# Patient Record
Sex: MALE | Race: WHITE | NOT HISPANIC OR LATINO | Employment: OTHER | URBAN - METROPOLITAN AREA
[De-identification: names, ages, dates, MRNs, and addresses within clinical notes are randomized per-mention and may not be internally consistent; named-entity substitution may affect disease eponyms.]

---

## 2018-07-16 ENCOUNTER — HOSPITAL ENCOUNTER (OUTPATIENT)
Facility: HOSPITAL | Age: 78
Setting detail: OUTPATIENT SURGERY
Discharge: HOME | End: 2018-07-16
Attending: COLON & RECTAL SURGERY | Admitting: COLON & RECTAL SURGERY
Payer: MEDICARE

## 2018-07-16 ENCOUNTER — ANESTHESIA (OUTPATIENT)
Dept: GASTROENTEROLOGY | Facility: HOSPITAL | Age: 78
End: 2018-07-16
Payer: MEDICARE

## 2018-07-16 ENCOUNTER — ANESTHESIA EVENT (OUTPATIENT)
Dept: GASTROENTEROLOGY | Facility: HOSPITAL | Age: 78
End: 2018-07-16
Payer: MEDICARE

## 2018-07-16 VITALS
OXYGEN SATURATION: 97 % | BODY MASS INDEX: 24.38 KG/M2 | HEIGHT: 74 IN | DIASTOLIC BLOOD PRESSURE: 64 MMHG | RESPIRATION RATE: 20 BRPM | TEMPERATURE: 96 F | SYSTOLIC BLOOD PRESSURE: 122 MMHG | WEIGHT: 190 LBS | HEART RATE: 60 BPM

## 2018-07-16 DIAGNOSIS — K57.90 DIVERTICULOSIS OF INTESTINE WITHOUT PERFORATION OR ABSCESS WITHOUT BLEEDING: ICD-10-CM

## 2018-07-16 DIAGNOSIS — Z86.010 HISTORY OF COLONIC POLYPS: ICD-10-CM

## 2018-07-16 PROCEDURE — 88305 TISSUE EXAM BY PATHOLOGIST: CPT | Performed by: PATHOLOGY

## 2018-07-16 RX ORDER — LISINOPRIL 10 MG/1
10 TABLET ORAL
COMMUNITY
Start: 2018-04-16

## 2018-07-16 RX ORDER — TACROLIMUS 0.3 MG/G
OINTMENT TOPICAL
COMMUNITY

## 2018-07-16 RX ORDER — CLOBETASOL PROPIONATE 0.5 MG/G
OINTMENT TOPICAL
COMMUNITY
Start: 2017-09-18

## 2018-07-16 RX ORDER — ROSUVASTATIN CALCIUM 10 MG/1
10 TABLET, COATED ORAL
COMMUNITY
Start: 2018-04-30

## 2018-07-16 RX ORDER — SODIUM CHLORIDE 9 MG/ML
75 INJECTION, SOLUTION INTRAVENOUS CONTINUOUS
Status: DISCONTINUED | OUTPATIENT
Start: 2018-07-16 | End: 2018-07-16 | Stop reason: HOSPADM

## 2018-07-16 RX ORDER — PROPOFOL 10 MG/ML
INJECTION, EMULSION INTRAVENOUS AS NEEDED
Status: DISCONTINUED | OUTPATIENT
Start: 2018-07-16 | End: 2018-07-16 | Stop reason: SURG

## 2018-07-16 RX ORDER — CALCIPOTRIENE, BETAMETHASONE DIPROPIONATE 50; .643 UG/G; MG/G
OINTMENT TOPICAL
COMMUNITY

## 2018-07-16 RX ORDER — KETOCONAZOLE 20 MG/G
2 CREAM TOPICAL
COMMUNITY
Start: 2011-08-18

## 2018-07-16 RX ORDER — CARVEDILOL 12.5 MG/1
12.5 TABLET ORAL
COMMUNITY
Start: 2018-04-10

## 2018-07-16 RX ORDER — LORAZEPAM 0.5 MG/1
TABLET ORAL
COMMUNITY
Start: 2018-06-04 | End: 2020-01-08 | Stop reason: SDUPTHER

## 2018-07-16 RX ORDER — SPIRONOLACTONE 25 MG/1
25 TABLET ORAL
COMMUNITY

## 2018-07-16 RX ORDER — TAMSULOSIN HYDROCHLORIDE 0.4 MG/1
0.4 CAPSULE ORAL
COMMUNITY
Start: 2017-10-09

## 2018-07-16 RX ADMIN — PROPOFOL 30 MG: 10 INJECTION, EMULSION INTRAVENOUS at 10:26

## 2018-07-16 RX ADMIN — PROPOFOL 30 MG: 10 INJECTION, EMULSION INTRAVENOUS at 10:39

## 2018-07-16 RX ADMIN — PROPOFOL 20 MG: 10 INJECTION, EMULSION INTRAVENOUS at 10:46

## 2018-07-16 RX ADMIN — PROPOFOL 30 MG: 10 INJECTION, EMULSION INTRAVENOUS at 10:28

## 2018-07-16 RX ADMIN — SODIUM CHLORIDE: 0.9 INJECTION, SOLUTION INTRAVENOUS at 08:55

## 2018-07-16 RX ADMIN — PROPOFOL 30 MG: 10 INJECTION, EMULSION INTRAVENOUS at 10:43

## 2018-07-16 RX ADMIN — PROPOFOL 200 MG: 10 INJECTION, EMULSION INTRAVENOUS at 10:23

## 2018-07-16 RX ADMIN — PROPOFOL 30 MG: 10 INJECTION, EMULSION INTRAVENOUS at 10:34

## 2018-07-16 RX ADMIN — PROPOFOL 30 MG: 10 INJECTION, EMULSION INTRAVENOUS at 10:31

## 2018-07-16 RX ADMIN — PROPOFOL 20 MG: 10 INJECTION, EMULSION INTRAVENOUS at 10:36

## 2018-07-16 NOTE — OP NOTE
**** GI/ENDOSCOPY REPORT ****     PATIENT NAME: Branden Hall ------ VISIT ID:     INTRODUCTION: Colonoscopy - A 77 male patient presents for an outpatient   Colonoscopy at [address] [place] [address]  PREVIOUS COLONOSCOPY:     INDICATIONS: Screening for personal history of polyps  CONSENT:  The benefits, risks, and alternatives to the procedure were   discussed and informed consent was obtained from the patient  PREPARATION: EKG, pulse, pulse oximetry and blood pressure were monitored   throughout the procedure  The patient was identified by myself both   verbally and by visual inspection of ID band  ASA Classification: Class 2   - Patient has mild to moderate systemic disturbance that may or may not be   related to the disorder requiring surgery  Airway Assessment   Classification: Airway class 1 - Visualization of the soft palate, fauces,   uvula, and posterior pillars  EKG, pulse, pulse oximetry, and blood   pressure were monitored throughout the procedure  The patient was   identified by myself both verbally and by visual inspection of ID band  MEDICATIONS: Anesthesia-check records Anesthesia-check records     RECTAL EXAM: Normal sphincter tone  No external hemorrhoids  No internal   hemorrhoids  No prostatic nodules  Moderately enlarged prostate  PROCEDURE:  The endoscope was passed without difficulty through the anus   under direct visualization and advanced to the cecum, confirmed by   photographs  The quality of the preparation was  The scope was withdrawn   and the mucosa was carefully examined  The views were good  The patient's   toleration of the procedure was good  Cecal Intubation Time: 8 minutes(s)   Scope Withdrawal Time: 8 minutes(s)     FINDINGS:  A single frond-like/villous, sessile polyp, measuring between 5   and 10 mm in size, was found in the ascending colon    A single   frond-like/villous, sessile polyp, measuring between 5 and 10 mm in size,   was found in the ascending colon  The polyp was removed by hot biopsy   polypectomy and placed in jar 1  A single frond-like/villous, sessile   polyp, measuring between 5 and 10 mm in size, was found in the rectum  The polyp was removed by hot biopsy polypectomy and placed in jar 2  There   was evidence of mild diverticulosis in the ascending colon and transverse   colon  Otherwise, the colon appeared to be normal  Appendiceal opening   identified     COMPLICATIONS: There were no complications  IMPRESSIONS: Moderately enlarged prostate was noted during the rectal   exam  A single frond-like/villous, sessile polyp found in the ascending   colon; removed by hot biopsy polypectomy  a single frond-like/villous,   sessile polyp found in the ascending colon; removed by hot biopsy   polypectomy  A single frond-like/villous, sessile polyp found in the   rectum; removed by hot biopsy polypectomy  Mild diverticulosis found in   the ascending colon and transverse colon  Otherwise normal colonoscopy  Otherwise normal colonoscopy  RECOMMENDATIONS: Colonoscopy recommended in on an as a needed basis  Continue current medications  Start high fiber diet  Call if any signs or   symptoms of abdominal pain, bleeding or diverticulitis  Daily: 81 mg   aspirin, Vit  D, five fruits daily, and exercise  Call if you are having   any problems: Dr Wing Factor [phone]  ESTIMATED BLOOD LOSS:     PATHOLOGY SPECIMENS: Single polyp removed by hot biopsy polypectomy (jar   1)   Single polyp removed by hot biopsy polypectomy (jar 2)        PROCEDURE CODES: 36146: Colonoscopy with removal of tumor(s), polyp(s), or   other lesion(s) by hot biopsy forceps or bipolar cautery     ICD-9 Codes: V12 72 Personal history of colonic polyps 600 00 Hypertrophy   (benign) of prostate without urinary obstruction and other lower urinary   tract symptom (LUTS) 211 3 Benign neoplasm of colon 211 3 Benign neoplasm   of colon 211 4 Benign neoplasm of rectum and anal canal 562 10   Diverticulosis of colon (without mention of hemorrhage)     ICD-10 Codes: Z86 010 Personal history of colonic polyps N40 Enlarged   prostate K63 5 Polyp of [id number] 5 Polyp of colon K63 5 Polyp of colon K57   Diverticular disease of intestine     PERFORMED BY: HIRA Kent  on 07/16/2018  Version 1, electronically signed by HIRA Kent  on   07/16/2018 at 10:56

## 2018-07-16 NOTE — H&P
History and Physical   Colon and Rectal Surgery   Jj Krishnan 77 y o  male MRN: [id number]  Unit/Bed#: [facility] [place] Encounter: [id number]  07/16/18   10:21 AM      No chief complaint on file  History of Present Illness   HPI:  Jj Krishnan is a 77 y o  male who presents with h/o polyp  Historical Information   Past Medical History:   Diagnosis Date    [facility]     Diabetes mellitus ([facility] )     Hypercholesteremia     Hypertension     Myocardial infarct, old      Past Surgical History:   Procedure Laterality Date    APPENDECTOMY      CARDIAC DEFIBRILLATOR PLACEMENT      HERNIA REPAIR      INSERT / REPLACE / REMOVE PACEMAKER         Meds/Allergies     Prescriptions Prior to Admission   Medication    calcipotriene-betamethasone (TACLONEX) ointment    carvedilol (COREG) 12 5 mg tablet    clobetasol (TEMOVATE) 0 05 % ointment    ketoconazole (NIZORAL) 2 % cream    lisinopril (ZESTRIL) 10 mg tablet    LORazepam (ATIVAN) 0 5 mg tablet    metFORMIN (GLUCOPHAGE) 500 mg tablet    rosuvastatin (CRESTOR) 10 MG tablet    sertraline (ZOLOFT) 50 mg tablet    sitaGLIPtin (JANUVIA) 100 mg tablet    spironolactone (ALDACTONE) 25 mg tablet    tacrolimus (PROTOPIC) 0 03 % ointment    tamsulosin (FLOMAX) 0 4 mg         Current Facility-Administered Medications:     sodium chloride 0 9 % infusion, 75 mL/hr, Intravenous, Continuous, Mike Tesoriero, DO    Allergies   Allergen Reactions    Nsaids Shortness Of Breath    Aspirin     Iodine Other (See Comments)     THROAT SWELLS    Shellfish-Derived Products          Social History   History   Alcohol Use    4 2 oz/week    7 Glasses of wine per week     History   Drug use: Unknown     History   Smoking Status    Former Smoker    Quit date: 5/16/1998   Smokeless Tobacco    Never Used         Family History: History reviewed  No pertinent family history        Objective     Current Vitals:   Blood Pressure: 162/99 (07/16/18 0949)  Pulse: 63 (07/16/18 6105)  Temperature: (!) 96 °F (35 6 °C) (07/16/18 0949)  Respirations: 18 (07/16/18 0949)  Height: 6' 2" (188 cm) (07/16/18 0949)  Weight - Scale: 86 2 kg (190 lb) (07/16/18 0949)  SpO2: 98 % (07/16/18 0949)  No intake or output data in the 24 hours ending 07/16/18 1021    Physical Exam:  General: No acute distress  Eyes: Normal   ENT: Normal   Neck: No JVD  Pulm: Normal in A&P  CV: NSR no murmur  Abdomen: Soft and normal on palpation, no mass, no tenderness, no guarding  Rectal: Normal sphincter tone, no perianal skin lesions  Extremities: Normal  Lymphatics: Normal        Lab Results: I have personally reviewed pertinent lab results  Imaging: I have personally reviewed pertinent reports  Patient was consented by myself for procedure as explained earlier with all the risks and benefits described  All questions answered  ASSESSMENT:  Mario Mckenna is a 77 y o  male who presents with h/o polyp        PLAN:    colonoscopy

## 2018-07-16 NOTE — ANESTHESIA PREPROCEDURE EVALUATION
Review of Systems/Medical History  Patient summary reviewed    No history of anesthetic complications     Cardiovascular  Exercise tolerance (METS): >4,  Pacemaker/AICD, Hyperlipidemia, Hypertension , Past MI Unspecified site/ episode of care,    Pulmonary  Negative pulmonary ROS        GI/Hepatic    Bowel prep            Endo/Other  Diabetes well controlled type 2 Oral agent,      GYN       Hematology  Negative hematology ROS      Musculoskeletal  Negative musculoskeletal ROS        Neurology   Psychology   Negative psychology ROS              Physical Exam    Airway    Mallampati score: II  TM Distance: >3 FB  Neck ROM: full     Dental   No notable dental hx     Cardiovascular  Rhythm: regular, Rate: normal,     Pulmonary  Breath sounds clear to auscultation,     Other Findings        Anesthesia Plan  ASA Score- 3     Anesthesia Type- IV sedation with anesthesia with ASA Monitors  Additional Monitors:   Airway Plan:         Plan Factors-    Induction- intravenous  Postoperative Plan-     Informed Consent- Anesthetic plan and risks discussed with patient  I personally reviewed this patient with the CRNA  Discussed and agreed on the Anesthesia Plan with the CRNA  Yinka Norman

## 2018-07-16 NOTE — ANESTHESIA POSTPROCEDURE EVALUATION
Post-Op Assessment Note      CV Status:  Stable    Mental Status:  Alert and awake    Hydration Status:  Euvolemic    PONV Controlled:  Controlled    Airway Patency:  Patent and adequate    Post Op Vitals Reviewed: Yes          Staff: CRNA           BP   113/65   Temp     Pulse  70   Resp      SpO2   93%

## 2020-01-08 ENCOUNTER — OFFICE VISIT (OUTPATIENT)
Dept: PSYCHIATRY | Facility: CLINIC | Age: 80
End: 2020-01-08
Payer: MEDICARE

## 2020-01-08 DIAGNOSIS — F33.1 MODERATE EPISODE OF RECURRENT MAJOR DEPRESSIVE DISORDER (HCC): Primary | ICD-10-CM

## 2020-01-08 DIAGNOSIS — F41.1 GENERALIZED ANXIETY DISORDER: ICD-10-CM

## 2020-01-08 PROBLEM — F33.9 EPISODE OF RECURRENT MAJOR DEPRESSIVE DISORDER (HCC): Status: ACTIVE | Noted: 2020-01-08

## 2020-01-08 PROBLEM — F41.9 ANXIETY: Status: ACTIVE | Noted: 2020-01-08

## 2020-01-08 PROCEDURE — 99205 OFFICE O/P NEW HI 60 MIN: CPT | Performed by: NURSE PRACTITIONER

## 2020-01-08 RX ORDER — VENLAFAXINE HYDROCHLORIDE 37.5 MG/1
37.5 CAPSULE, EXTENDED RELEASE ORAL DAILY
Qty: 30 CAPSULE | Refills: 2 | Status: SHIPPED | OUTPATIENT
Start: 2020-01-08 | End: 2020-02-13 | Stop reason: SDUPTHER

## 2020-01-08 RX ORDER — LORAZEPAM 0.5 MG/1
0.5 TABLET ORAL 2 TIMES DAILY PRN
Qty: 60 TABLET | Refills: 1 | Status: SHIPPED | OUTPATIENT
Start: 2020-01-08 | End: 2020-02-13 | Stop reason: ALTCHOICE
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
## 2020-02-13 ENCOUNTER — OFFICE VISIT (OUTPATIENT)
Dept: PSYCHIATRY | Facility: CLINIC | Age: 80
End: 2020-02-13
Payer: MEDICARE

## 2020-02-13 DIAGNOSIS — F33.1 MODERATE EPISODE OF RECURRENT MAJOR DEPRESSIVE DISORDER (HCC): ICD-10-CM

## 2020-02-13 PROCEDURE — 99213 OFFICE O/P EST LOW 20 MIN: CPT | Performed by: NURSE PRACTITIONER

## 2020-02-13 RX ORDER — VENLAFAXINE HYDROCHLORIDE 37.5 MG/1
37.5 CAPSULE, EXTENDED RELEASE ORAL DAILY
Qty: 30 CAPSULE | Refills: 5 | Status: SHIPPED | OUTPATIENT
Start: 2020-02-13 | End: 2020-05-12 | Stop reason: ALTCHOICE

## 2020-02-13 NOTE — PSYCH
PROGRESS NOTE        [address] [facility]      Name and Date of Birth:  Nehal Hickman [de-identified] y o  1940    Date of Visit: 02/13/20    SUBJECTIVE:    Vladislav Carrera is seen today for treatment of major depressive disorder and generalized anxiety disorder  On examination today, he is doing very well on Effexor 37 5 mg daily  He is now off Zoloft and he is not taking Ativan  He says he really did not like the way it made him feel  He also said his anxiety is under much better control now he is off Zoloft and on another agent  He is in no need of increase at this time so we will continue Effexor as is at 37 5 mg daily and will follow-up with him in 3 months or sooner if necessary  He denies suicidal ideation, intent or plan at present, has no suicidal ideation, intent or plan at present  He denies any auditory hallucinations and visual hallucinations, denies any other delusional thinking, denies any delusional thinking  He denies any side effects from medications    HPI ROS Appetite Changes and Sleep: normal appetite, normal sleep    Review Of Systems:      Constitutional Negative   ENT Negative   Cardiovascular Negative   Respiratory As Noted in HPI   Gastrointestinal Negative   Genitourinary Negative   Musculoskeletal Negative   Integumentary Negative   Neurological Negative   Endocrine Negative   Other Symptoms Negative and None       Laboratory Results: No results found for this or any previous visit  Substance Abuse History:    Social History     Substance and Sexual Activity   Drug Use Not on file       Family Psychiatric History:     No family history on file      The following portions of the patient's history were reviewed and updated as appropriate: past family history, past medical history, past social history, past surgical history and problem list     Social History     Socioeconomic History    Marital status: /Civil Union     Spouse name: Not on file    Number of children: Not on file    Years of education: Not on file    Highest education level: Not on file   Occupational History    Not on file   Social Needs    Financial resource strain: Not on file    Food insecurity:     Worry: Not on file     Inability: Not on file    Transportation needs:     Medical: Not on file     Non-medical: Not on file   Tobacco Use    Smoking status: Former Smoker     Last attempt to quit: 1998     Years since quittin 7    Smokeless tobacco: Never Used   Substance and Sexual Activity    Alcohol use:  Yes     Alcohol/week: 7 0 standard drinks     Types: 7 Glasses of wine per week    Drug use: Not on file    Sexual activity: Not on file   Lifestyle    Physical activity:     Days per week: Not on file     Minutes per session: Not on file    Stress: Not on file   Relationships    Social connections:     Talks on phone: Not on file     Gets together: Not on file     Attends Roman [employer] service: Not on file     Active member of club or organization: Not on file     Attends meetings of clubs or organizations: Not on file     Relationship status: Not on file    Intimate partner violence:     Fear of current or ex partner: Not on file     Emotionally abused: Not on file     Physically abused: Not on file     Forced sexual activity: Not on file   Other Topics Concern    Not on file   Social History Narrative    Not on file     Social History     Social History Narrative    Not on file        Social History     Tobacco History     Smoking Status  Former Smoker Quit date  1998    Smokeless Tobacco Use  Never Used          Alcohol History     Alcohol Use Status  Yes Drinks/Week  7 Glasses of wine per week Amount  7 0 standard drinks of alcohol/wk          Drug Use     Drug Use Status  Not Asked          Sexual Activity     Sexually Active  Not Asked          Activities of Daily Living    Not Asked                     OBJECTIVE:     Mental Status Evaluation:    Appearance age appropriate, casually dressed   Behavior pleasant, cooperative   Speech normal volume, normal pitch   Mood improved   Affect brighter   Thought Processes logical   Associations intact associations   Thought Content normal   Perceptual Disturbances: none   Abnormal Thoughts  Risk Potential Suicidal ideation - None  Homicidal ideation - None  Potential for aggression - No   Orientation oriented to person, place, time/date and situation   Memory recent and remote memory grossly intact   Cosciousness alert and awake   Attention Span Good   Intellect Appears to be of Average Intelligence   Insight Good   Judgement Good   Muscle Strength and  Gait muscle strength and tone were normal   Language no difficulty naming common objects   Fund of Knowledge displays adequate knowledge of current events   Pain None   Pain Scale 0       Assessment/Plan:       Diagnoses and all orders for this visit:    Moderate episode of recurrent major depressive disorder (HCC)  -     venlafaxine (EFFEXOR-XR) 37 5 mg 24 hr capsule; Take 1 capsule (37 5 mg total) by mouth daily          Treatment Recommendations/Precautions:    Continue current medications:  Continue Effexor XR 37 5 mg daily  Follow-up in 3 months or sooner if necessary  Risks/Benefits      Risks, Benefits And Possible Side Effects Of Medications:    Risks, benefits, and possible side effects of medications explained to patient and patient verbalizes understanding and agreement for treatment      Controlled Medication Discussion:     Not applicable    Psychotherapy Provided:     Individual psychotherapy provided: No

## 2020-05-12 ENCOUNTER — TELEMEDICINE (OUTPATIENT)
Dept: PSYCHIATRY | Facility: CLINIC | Age: 80
End: 2020-05-12
Payer: MEDICARE

## 2020-05-12 DIAGNOSIS — F33.1 MODERATE EPISODE OF RECURRENT MAJOR DEPRESSIVE DISORDER (HCC): Primary | ICD-10-CM

## 2020-05-12 PROCEDURE — 99443 PR PHYS/QHP TELEPHONE EVALUATION 21-30 MIN: CPT | Performed by: NURSE PRACTITIONER

## 2020-08-03 ENCOUNTER — TELEMEDICINE (OUTPATIENT)
Dept: PSYCHIATRY | Facility: CLINIC | Age: 80
End: 2020-08-03
Payer: MEDICARE

## 2020-08-03 DIAGNOSIS — F41.1 GENERALIZED ANXIETY DISORDER: Primary | ICD-10-CM

## 2020-08-03 PROCEDURE — 99443 PR PHYS/QHP TELEPHONE EVALUATION 21-30 MIN: CPT | Performed by: NURSE PRACTITIONER

## 2020-08-03 NOTE — PSYCH
TREATMENT PLAN (Medication Management Only)        [facility]    Name and Date of Birth:  Cristobal Lopez 79 y o  1940  Date of Treatment Plan: August 3, 2020  Diagnosis/Diagnoses:    1  Generalized anxiety disorder      Strengths/Personal Resources for Self-Care: supportive family, supportive friends  Area/Areas of need (in own words): "Doing well on no psychiatric medications "  1  Long Term Goal: "Continue to feel this good "  Target Date:  Patient will call p r n  Person/Persons responsible for completion of goal: Carlyn Cuenca  2  Short Term Objective (s) - How will we reach this goal?:   A  Provider new recommended medication/dosage changes and/or continue medication(s): Patient on no psychiatric medications  He will call as needed for any breakthrough symptoms of anxiety or depression  B   N/A   C   N/A  Target Date: Depression and anxiety resolved  Person/Persons Responsible for Completion of Goal: Carlyn Cuenca  Progress Towards Goals: stable  Treatment Modality: Patient call p r n  Review due 6 months from date of this plan: No review necessary  Expected length of service: maintenance unless revised  My Physician/PA/NP and I have developed this plan together and I agree to work on the goals and objectives  I understand the treatment goals that were developed for my treatment